# Patient Record
Sex: FEMALE | NOT HISPANIC OR LATINO | URBAN - METROPOLITAN AREA
[De-identification: names, ages, dates, MRNs, and addresses within clinical notes are randomized per-mention and may not be internally consistent; named-entity substitution may affect disease eponyms.]

---

## 2023-07-19 ENCOUNTER — EMERGENCY (EMERGENCY)
Facility: HOSPITAL | Age: 28
LOS: 1 days | Discharge: HOME | End: 2023-07-19
Attending: EMERGENCY MEDICINE
Payer: MEDICAID

## 2023-07-19 VITALS
OXYGEN SATURATION: 100 % | SYSTOLIC BLOOD PRESSURE: 118 MMHG | DIASTOLIC BLOOD PRESSURE: 82 MMHG | TEMPERATURE: 98 F | RESPIRATION RATE: 17 BRPM | HEART RATE: 62 BPM

## 2023-07-19 VITALS
RESPIRATION RATE: 16 BRPM | WEIGHT: 138.01 LBS | SYSTOLIC BLOOD PRESSURE: 123 MMHG | HEART RATE: 82 BPM | HEIGHT: 64 IN | DIASTOLIC BLOOD PRESSURE: 84 MMHG | TEMPERATURE: 98 F | OXYGEN SATURATION: 98 %

## 2023-07-19 PROCEDURE — 73110 X-RAY EXAM OF WRIST: CPT | Mod: 26,LT

## 2023-07-19 PROCEDURE — 99283 EMERGENCY DEPT VISIT LOW MDM: CPT | Mod: 25

## 2023-07-19 PROCEDURE — 99284 EMERGENCY DEPT VISIT MOD MDM: CPT

## 2023-07-19 PROCEDURE — 73110 X-RAY EXAM OF WRIST: CPT

## 2023-07-19 RX ORDER — IBUPROFEN 200 MG
600 TABLET ORAL ONCE
Refills: 0 | Status: COMPLETED | OUTPATIENT
Start: 2023-07-19 | End: 2023-07-19

## 2023-07-19 RX ORDER — ACETAMINOPHEN 500 MG
975 TABLET ORAL ONCE
Refills: 0 | Status: COMPLETED | OUTPATIENT
Start: 2023-07-19 | End: 2023-07-19

## 2023-07-19 RX ADMIN — Medication 975 MILLIGRAM(S): at 18:44

## 2023-07-19 RX ADMIN — Medication 600 MILLIGRAM(S): at 18:44

## 2023-07-19 NOTE — ED PROVIDER NOTE - PROGRESS NOTE DETAILS
Pt xray with questioned old triquetral fracture. Pt current pain/swelling is on opposite side of reported finding. Discussed results with patient, she does not recall prior wrist injury. Placed in removable wrist splint. Will provide pt w/ hand follow up and d/c home   Rosario Spivey PA-C

## 2023-07-19 NOTE — ED PROVIDER NOTE - PATIENT PORTAL LINK FT
You can access the FollowMyHealth Patient Portal offered by [facility] by registering at the following website: [email or web address]. By joining [employer]’s FollowMyHealth portal, you will also be able to view your health information using other applications (apps) compatible with our system.

## 2023-07-19 NOTE — ED PROVIDER NOTE - OBJECTIVE STATEMENT
27 year old female with 27 year old female with no sig pmhx presents to the ED c/o atraumatic L wrist pain for the past 2 days. Pt reports She fell asleep with her watch on her left wrist and the night of 7/16.  The next day she woke up with mild left-sided wrist pain and noticed some swelling to the area.  She reports that since she has had persistent pain in the left wrist and feels swelling is gotten worse.  She reports that pain is worse with movement of the wrist.  She has not taken anything for pain thus far.  She denies having similar in the past.  She denies fevers, chills, redness, rashes, weakness, numbness, tingling, falls, trauma or any other concerns.  She is right-hand dominant.

## 2023-07-19 NOTE — ED PROVIDER NOTE - ATTENDING APP SHARED VISIT CONTRIBUTION OF CARE
Attending MD Daley:   I personally have seen and examined this patient.  Physician assistant note reviewed and agree on plan of care and except where noted.  See below for details.     Seen in [facility]    27F with no reported contributory PMH/PSH/Meds, no known drug allergies presents to the ED with L wrist pain. Reports Thursday 7/13 or Friday 7/14 may have lifted a child at work but does not remember having pain at that time.  Reports Sunday night slept with her Apple Watch on and Monday morning woke up with pain at L wrist (indicates dorsal wrist on radial aspect).  Denies fall, other trauma, obvious inciting event, MVC.  Denies previous episode.  Denies fevers, chills.  Reports minimal if any pain with side to side movement of wrist but reports pain with flexion and extension of wrist.  Denies limitation of ROM of fingers.  Reports R hand dominant.  Denies history of gout.      Exam:   General: NAD  HENT: head NCAT, airway patent   Chest: symmetric chest rise, no increased work of breathing  MSK: ranging neck freely, ROM intact side to side, limitation of ROM of wrist in flexion/extension secondary to pain, mild edema to radial aspect of hand extending to wrist dorsal and ventral aspect, +2 radials, cap refill <2s, no erythema, no increased warmth, no break in skin  Neuro: moving all extremities spontaneously, sensory grossly intact, no gross neuro deficits  Psych: normal mood and affect     A/P: 27F with L wrist pain, will obtain XR to eval for bony injury, will evaluate for but not limited to sprain, ganglion cyst, history and clinical picture not consistent with septic joint, will defer labs at this time, will give analgesia and reassess

## 2023-07-19 NOTE — ED ADULT NURSE NOTE - OBJECTIVE STATEMENT
28 y/o female presenting to ED ambulatory through waiting room for left wrist pain/ swelling x 4 days. pt reports 4 days ago she was playing with a group of kids and lifting them but did not pin point specific even that caused pain, woke up next day with left wrist swelling, pain and discomfort moving wrist. pt aox4 breathing even unlabored spontaneously, no deformity noted to left wrist.

## 2023-07-19 NOTE — ED PROVIDER NOTE - PHYSICAL EXAMINATION
CONSTITUTIONAL: Patient is awake, alert and oriented x 3. Patient is well appearing and in no acute distress  HEAD: NCAT  EYES: PERRL b/l, EOMI  ENT: Airway patent, Nasal mucosa clear. Mouth with normal mucosa. Throat has no vesicles, no oropharyngeal exudates and uvula is midline  NECK: supple, FROM  LUNGS: CTA b/l, no wheezing or rales   HEART: RRR.+S1S2 no murmurs  ABDOMEN: Soft, non-distended, nttp,  no rebound or guarding  EXTREMITY: FROM right upper and lower ext b/l. LUE: FROM shoulder and elbow without deformity or ttp.   SKIN: with no rash or lesions  NEURO: No focal deficits CONSTITUTIONAL: Patient is awake, alert and oriented x 3. Patient is well appearing and in no acute distress  HEAD: NCAT  NECK: supple, FROM  LUNGS: CTA b/l, no wheezing or rales   HEART: RRR.+S1S2 no murmurs  ABDOMEN: Soft, non-distended, nttp, no rebound or guarding  EXTREMITY: FROM right upper and lower ext b/l. LUE: FROM shoulder and elbow without deformity or ttp. There is focal mild swelling to the dorsal lateral wrist with small area bruising to the volar aspect of the lateral wrist. Pain limited ROM of the wrist. No erythema to the wrist or overlying warmth. No ttp to the metatarsals or digits 1-5. Strong palpable radial pulse with normal gross sensation throughout the LUE    SKIN: with no rash or lesions  NEURO: No focal deficits

## 2023-07-19 NOTE — ED PROVIDER NOTE - NSFOLLOWUPINSTRUCTIONS_ED_ALL_ED_FT
1. Rest. Apply cold pack  to your wrist for 20 minute intervals multiple times daily. Elevate your wrist when possible. Use removable splint as needed/tolerated    2. For continued or recurrent pain recommend taking over the counter Tylenol (acetaminophen) 1000 mg every 6 hours as needed and/or over the counter Motrin (Ibuprofen/Advil) 600mg every 6 hours as needed    3. Follow up with Orthopedic Hand Specialist  after discharge for reevaluation and continued management. Please see office information below to call and schedule appointment:       Ray Krause    Orthopaedic Surgery    [address] [place] [address], [address]    [place] [address]    Phone: [phone]    Fax: [phone]    4. We have reached out to our care coordinators to help schedule appointment and you should expect a call in the next 24-48 hours to expedite appointment     5. Return to ED for new or worsened symptoms including severe pain, swelling, numbness/tingling, bluish discoloration or any concerns

## 2023-07-19 NOTE — ED PROVIDER NOTE - NS ED ATTENDING STATEMENT MOD
This was a shared visit with the BRIDGETT. I reviewed and verified the documentation and independently performed the documented:

## 2023-07-20 PROBLEM — Z00.00 ENCOUNTER FOR PREVENTIVE HEALTH EXAMINATION: Status: ACTIVE | Noted: 2023-07-20

## 2023-08-01 ENCOUNTER — APPOINTMENT (OUTPATIENT)
Dept: ORTHOPEDIC SURGERY | Facility: CLINIC | Age: 28
End: 2023-08-01
Payer: MEDICAID

## 2023-08-01 ENCOUNTER — NON-APPOINTMENT (OUTPATIENT)
Age: 28
End: 2023-08-01

## 2023-08-01 VITALS — HEIGHT: 64 IN | WEIGHT: 140 LBS | BODY MASS INDEX: 23.9 KG/M2

## 2023-08-01 DIAGNOSIS — M25.532 PAIN IN LEFT WRIST: ICD-10-CM

## 2023-08-01 PROCEDURE — 99212 OFFICE O/P EST SF 10 MIN: CPT

## 2024-01-21 ENCOUNTER — NON-APPOINTMENT (OUTPATIENT)
Age: 29
End: 2024-01-21